# Patient Record
Sex: FEMALE | Race: WHITE | NOT HISPANIC OR LATINO | Employment: FULL TIME | URBAN - METROPOLITAN AREA
[De-identification: names, ages, dates, MRNs, and addresses within clinical notes are randomized per-mention and may not be internally consistent; named-entity substitution may affect disease eponyms.]

---

## 2021-10-01 ENCOUNTER — APPOINTMENT (OUTPATIENT)
Dept: WOMENS IMAGING | Facility: HOSPITAL | Age: 41
End: 2021-10-01

## 2021-10-01 PROCEDURE — 77067 SCR MAMMO BI INCL CAD: CPT | Performed by: RADIOLOGY

## 2021-10-01 PROCEDURE — 77063 BREAST TOMOSYNTHESIS BI: CPT | Performed by: RADIOLOGY

## 2023-01-26 ENCOUNTER — OFFICE VISIT (OUTPATIENT)
Dept: FAMILY MEDICINE CLINIC | Facility: CLINIC | Age: 43
End: 2023-01-26
Payer: COMMERCIAL

## 2023-01-26 VITALS
OXYGEN SATURATION: 98 % | WEIGHT: 207 LBS | RESPIRATION RATE: 16 BRPM | SYSTOLIC BLOOD PRESSURE: 138 MMHG | HEART RATE: 72 BPM | TEMPERATURE: 97.3 F | HEIGHT: 66 IN | DIASTOLIC BLOOD PRESSURE: 104 MMHG | BODY MASS INDEX: 33.27 KG/M2

## 2023-01-26 DIAGNOSIS — E66.09 CLASS 1 OBESITY DUE TO EXCESS CALORIES WITH SERIOUS COMORBIDITY AND BODY MASS INDEX (BMI) OF 33.0 TO 33.9 IN ADULT: ICD-10-CM

## 2023-01-26 DIAGNOSIS — Z00.00 ENCOUNTER FOR ANNUAL PHYSICAL EXAM: Primary | ICD-10-CM

## 2023-01-26 DIAGNOSIS — R03.0 ELEVATED BLOOD-PRESSURE READING WITHOUT DIAGNOSIS OF HYPERTENSION: ICD-10-CM

## 2023-01-26 PROCEDURE — 99386 PREV VISIT NEW AGE 40-64: CPT | Performed by: FAMILY MEDICINE

## 2023-01-26 NOTE — PROGRESS NOTES
Patient Care Team:  Ediun Rutledge MD as PCP - General (Urgent Care)     Chief complaint: Patient is in today for a physical          Patient is a 42 y.o. female who presents for her yearly physical exam.     HPI     To establish care, discuss the followings ;    Noticed Elevated BP , she stated that her BP at home also elevated   Denies CP/HA   Diet: eating RD  Exercise: Do exercise   Smoking hx ; not smoker   Immunizations: Discussed flu, COVID, Tdap shot, Up to Date  Dental: Annual check up       Health maintenance/lifestyle:  Immunization History   Administered Date(s) Administered   • COVID-19 (PFIZER) PURPLE CAP 11/21/2021   • FluMist 2-49yrs 10/18/2016         HM;  Pap:  done in 2021 , normal at [facility]   Mammogram:  done 1 year ago , following with GYN          PHQ-2 Depression Screening  Little interest or pleasure in doing things? 0-->not at all   Feeling down, depressed, or hopeless? 0-->not at all   PHQ-2 Total Score 0         Social History     Tobacco Use   Smoking Status Never   Smokeless Tobacco Never     Social History     Substance and Sexual Activity   Alcohol Use Yes   • Alcohol/week: 3.0 standard drinks   • Types: 3 Cans of beer per week    Comment: occ         Review of Systems   Cardiovascular: Negative for chest pain, palpitations and leg swelling.         History  Past Medical History:   Diagnosis Date   • Hypertension 01/01/2020      Past Surgical History:   Procedure Laterality Date   • WISDOM TOOTH EXTRACTION        No Known Allergies   Family History   Problem Relation Age of Onset   • Cancer Mother    • Alcohol abuse Mother    • Diabetes Father    • Cancer Maternal Aunt    • Breast cancer Maternal Grandmother    • Cancer Maternal Grandfather    • Cancer Paternal Grandmother      Social History     Socioeconomic History   • Marital status: Single   Tobacco Use   • Smoking status: Never   • Smokeless tobacco: Never   Substance and Sexual Activity   • Alcohol use: Yes      "Alcohol/week: 3.0 standard drinks     Types: 3 Cans of beer per week     Comment: occ   • Drug use: No   • Sexual activity: Yes     Partners: Male     Birth control/protection: I.U.D.      No current outpatient medications on file.                  BP (!) 138/104   Pulse 72   Temp 97.3 °F (36.3 °C)   Resp 16   Ht 167.6 cm (66\")   Wt 93.9 kg (207 lb)   SpO2 98%   BMI 33.41 kg/m²       Physical Exam  Vitals and nursing note reviewed.   Constitutional:       General: She is not in acute distress.     Appearance: She is not ill-appearing, toxic-appearing or diaphoretic.   HENT:      Head: Normocephalic.      Right Ear: Tympanic membrane, ear canal and external ear normal.      Left Ear: Tympanic membrane, ear canal and external ear normal.      Nose: Nose normal. No congestion or rhinorrhea.      Mouth/Throat:      Mouth: Mucous membranes are moist.      Pharynx: Oropharynx is clear. No oropharyngeal exudate or posterior oropharyngeal erythema.   Eyes:      General: No scleral icterus.        Right eye: No discharge.         Left eye: No discharge.      Extraocular Movements: Extraocular movements intact.      Conjunctiva/sclera: Conjunctivae normal.      Pupils: Pupils are equal, round, and reactive to light.   Neck:      Comments: No enlarged thyroid      Cardiovascular:      Rate and Rhythm: Normal rate and regular rhythm.      Heart sounds: Normal heart sounds. No murmur heard.    No friction rub. No gallop.   Pulmonary:      Effort: Pulmonary effort is normal. No respiratory distress.      Breath sounds: Normal breath sounds. No stridor. No wheezing, rhonchi or rales.   Abdominal:      General: Bowel sounds are normal. There is no distension.      Palpations: Abdomen is soft. There is no mass.      Tenderness: There is no abdominal tenderness. There is no right CVA tenderness, left CVA tenderness, guarding or rebound.      Hernia: No hernia is present.   Musculoskeletal:         General: Normal range of " motion.      Cervical back: Normal range of motion and neck supple.      Right lower leg: No edema.      Left lower leg: No edema.   Lymphadenopathy:      Cervical: No cervical adenopathy.   Skin:     General: Skin is warm.      Coloration: Skin is not jaundiced or pale.      Findings: No erythema or rash.   Neurological:      General: No focal deficit present.      Mental Status: She is alert and oriented to person, place, and time.      Cranial Nerves: No cranial nerve deficit.      Sensory: No sensory deficit.      Motor: No weakness.      Coordination: Coordination normal.      Gait: Gait normal.      Deep Tendon Reflexes: Reflexes normal.   Psychiatric:         Mood and Affect: Mood normal.         Behavior: Behavior normal.         Thought Content: Thought content normal.         Judgment: Judgment normal.                   Diagnoses and all orders for this visit:    1. Encounter for annual physical exam (Primary)  -     POC Urinalysis Dipstick, Automated  -     Lipid Panel; Future  -     Comprehensive Metabolic Panel; Future  -     CBC (No Diff); Future    2. Elevated blood-pressure reading without diagnosis of hypertension  - Close BP BG monitoring and f/u , Bp login form provided. Discussed the best way to check BP at home.   Encouraged patient to maintain a heart healthy diet/DASH diet, exercise as tolerated, limit sodium intake to no more than 1,500mg/day, limit alcohol intake, maintain medication compliance and practice relaxation techniques to reduce stress.       3. Class 1 obesity due to excess calories with serious comorbidity and body mass index (BMI) of 33.0 to 33.9 in adult;  - Patient's (Body mass index is 33.41 kg/m².) indicates that they are obese (BMI >30) with health related conditions that include hypertension . Weight is newly identified. BMI is is above average; BMI management plan is completed. We discussed portion control and increasing exercise.     -Age and sex appropriate physical  exam performed and documented.   -Updated past medical, family, social and surgical histories as well as allergies and care team list.   -Addressed care gaps listed in the medical record.  -Encouraged annual dental and vision exams as part of their overall health.  -Encouraged minimum of 30 minutes or more of exercise at a brisk walk or higher 5 days per week combined with a well-balanced diet.        Follow up: Return in about 3 weeks (around 2/16/2023) for f/u on HTN.  Plan of care discussed with pt. They verbalized understanding and agreement.     Eduin Rutledge MD   1/26/2023   15:33 EST